# Patient Record
(demographics unavailable — no encounter records)

---

## 2024-10-11 NOTE — ASSESSMENT
[FreeTextEntry1] : 75 year old woman with history as above presenting for follow up  #Persistent shortness of breath, fatigue - Lower suspicion for pulmonary primary issue given clear CT Chest noncon with no intrinsic lung pathology identified - Could be cardiac in origin given insidious course - No overt signs of heart failure - Last TTE >10 years ago - Will refer to cardiology for repeat TTE, possible stress test. Age would likely make utility of CTA coronaries less useful  #Throat clearing/postnasal drip - Discussed continued use of methods she is already using as above - Discussed possibility of asking ENT if any other options she can explore - Counseled if night coughing continues to be bothersome to reach out and cough suppressant for use at night could be prescribed  #Body temp changes - Unclear trigger and unclear cause - Would expect lexapro to held mitigate - No diarrhea, no headaches endorsed by patient, no weight changes. Would have lower suspicion for carcinoid syndrome - Patient to continue to monitor symptoms  #HCM - Flu shot previously received per patient - Received COVID booster 2 weeks prior to COVID infection per patient  Return to clinic in January or sooner as needed   Patient's visit and plan of care discussed with Dr. Yuan Rushing MD PGY3

## 2024-10-11 NOTE — HISTORY OF PRESENT ILLNESS
[FreeTextEntry1] : Follow up [de-identified] : 75 year old woman with history as below presenting for follow up  - Concerns regarding persistent throat clearing, body temp changes, shortness of breath that persists from last year, fatigue from last year - Had COVID 3 weeks ago  #Throat clearing - Persistent from a year ago. Has tried humidified air, tried cough drops, trying oral antihistamines. Endorses staying hydrated. Worse when she lays down, but not necessarily when she wakes up in the morning per her endorsement. Denies it being productive.  #Body temp changes - At least a year, feeling hot and cold. Not sure of obvious times when it occurs - Didn't feel like this when she went through menopause - Denies sweating when she feels warm  #Persistent shortness of breath, fatigue - Since last year. Had CT chest noncon in June which was negative for pathology - Patient feels she is feeling out of breath when walking from apartment to car, breathing through mouth. Endorses it can be on an incline - Endorses she is physically active, does yoga, weights/calisthenics, walks 5 days a week. - Recently during the activities, she is feeling short of breath too. Ends of feeling exhausted after doing them - No difficulty breathing when laying down flat. No lower extremity swelling

## 2024-10-11 NOTE — REVIEW OF SYSTEMS
[Fatigue] : fatigue [Hot Flashes] : hot flashes [Cough] : cough [Dyspnea on Exertion] : dyspnea on exertion [Fever] : no fever [Night Sweats] : no night sweats [Discharge] : no discharge [Vision Problems] : no vision problems [Earache] : no earache [Nasal Discharge] : no nasal discharge [Chest Pain] : no chest pain [Lower Ext Edema] : no lower extremity edema [Abdominal Pain] : no abdominal pain [Nausea] : no nausea [Constipation] : no constipation [Diarrhea] : diarrhea [Vomiting] : no vomiting [Dysuria] : no dysuria [Nocturia] : no nocturia [Joint Pain] : no joint pain [Joint Stiffness] : no joint stiffness [Itching] : no itching [Nail Changes] : no nail changes [Headache] : no headache [Dizziness] : no dizziness [Depression] : no depression [Easy Bleeding] : no easy bleeding

## 2024-10-11 NOTE — PHYSICAL EXAM
[Normal Outer Ear/Nose] : the outer ears and nose were normal in appearance [Normal] : normal rate, regular rhythm, normal S1 and S2 and no murmur heard [de-identified] : Dried blood present around nasal mucosa

## 2024-10-21 NOTE — DISCUSSION/SUMMARY
[FreeTextEntry1] : In summary, Ms. Rowley is a 75-year-old female with a history of dyspnea on exertion over the past 6 months.  Her exam is unremarkable.  Her blood pressure is normal, chest clear, and cardiac exam within normal limits.  An EKG shows a T wave inversion in V2 which has been noted in the past.  The cause and significance of her symptoms is unclear and it is uncertain if any heart disease is present.  However, she needs a workup.   A BNP was drawn.  In addition she was scheduled for an echo and stress ech she will continue on her current regimen of amlodipine 5 and rosuvastatin 5.  o.  [EKG obtained to assist in diagnosis and management of assessed problem(s)] : EKG obtained to assist in diagnosis and management of assessed problem(s)

## 2024-10-21 NOTE — HISTORY OF PRESENT ILLNESS
[FreeTextEntry1] : 75-year-old female being seen in cardiac evaluation because of a history of dyspnea on exertion.  She has been symptomatic for the past 6 months.  She is physically active and finds that things that she did in the past she has more difficulty doing and she has to stop earlier.  She sometimes gets vague chest discomfort associated with the shortness of breath.  She has no symptoms at rest.  She had an episode of COVID last month which aggravated her symptoms, but the COVID resolved and she is still symptomatic.  She has no prior history of heart disease.  She had a cardiac workup in 2012 because of palpitations and shortness of breath and nothing was found.  She is hypertensive and hypercholesterolemic.  She had an LDL cholesterol of 94 on rosuvastatin 5.

## 2024-11-12 NOTE — REASON FOR VISIT
show
[Follow-Up: _____] : a [unfilled] follow-up visit

## 2024-11-15 NOTE — PHYSICAL EXAM
[Normocephalic] : normocephalic [Atraumatic] : atraumatic [EOMI] : extra ocular movement intact [Sclera nonicteric] : sclera nonicteric [Supple] : supple [No Supraclavicular Adenopathy] : no supraclavicular adenopathy [No Cervical Adenopathy] : no cervical adenopathy [No Thyromegaly] : no thyromegaly [Examined in the supine and seated position] : examined in the supine and seated position [Asymmetrical] : asymmetrical [Bra Size: ___] : Bra Size: [unfilled] [No dominant masses] : no dominant masses in right breast  [No dominant masses] : no dominant masses left breast [No Nipple Retraction] : no left nipple retraction [No Nipple Discharge] : no left nipple discharge [No Axillary Lymphadenopathy] : no left axillary lymphadenopathy [No Edema] : no edema [No Rashes] : no rashes [No Ulceration] : no ulceration [de-identified] : Her left breast is slightly larger than her right. S/P bilateral breast reduction.

## 2024-11-15 NOTE — PHYSICAL EXAM
Lab Results   Component Value Date    EGFR 46 10/19/2022    EGFR 54 10/15/2022    EGFR 46 10/14/2022    CREATININE 1 43 (H) 10/19/2022    CREATININE 1 26 10/15/2022    CREATININE 1 42 (H) 10/14/2022     Creatinine appears to be approximately baseline, will continue to monitor with repeat labs in a m  [Normocephalic] : normocephalic [Atraumatic] : atraumatic [EOMI] : extra ocular movement intact [Sclera nonicteric] : sclera nonicteric [Supple] : supple [No Supraclavicular Adenopathy] : no supraclavicular adenopathy [No Cervical Adenopathy] : no cervical adenopathy [No Thyromegaly] : no thyromegaly [Examined in the supine and seated position] : examined in the supine and seated position [Asymmetrical] : asymmetrical [Bra Size: ___] : Bra Size: [unfilled] [No dominant masses] : no dominant masses in right breast  [No dominant masses] : no dominant masses left breast [No Nipple Retraction] : no left nipple retraction [No Nipple Discharge] : no left nipple discharge [No Axillary Lymphadenopathy] : no left axillary lymphadenopathy [No Edema] : no edema [No Rashes] : no rashes [No Ulceration] : no ulceration [de-identified] : Her left breast is slightly larger than her right. S/P bilateral breast reduction.

## 2024-11-15 NOTE — CONSULT LETTER
[Dear  ___] : Dear  [unfilled], [Courtesy Letter:] : I had the pleasure of seeing your patient, [unfilled], in my office today. [Please see my note below.] : Please see my note below. [Consult Closing:] : Thank you very much for allowing me to participate in the care of this patient.  If you have any questions, please do not hesitate to contact me. [Sincerely,] : Sincerely, [DrChrissie  ___] : Dr. JOHNSON [FreeTextEntry3] : Susan M. Palleschi, MD, FACS Division of Breast Surgery Director, Breast Surgery [address] [address] (Phone) [phone] (Fax) [phone]

## 2024-11-15 NOTE — CONSULT LETTER
[Dear  ___] : Dear  [unfilled], [Courtesy Letter:] : I had the pleasure of seeing your patient, [unfilled], in my office today. [Please see my note below.] : Please see my note below. [Consult Closing:] : Thank you very much for allowing me to participate in the care of this patient.  If you have any questions, please do not hesitate to contact me. [Sincerely,] : Sincerely, [DrChrissie  ___] : Dr. JOHNSON [FreeTextEntry3] : Susan M. Palleschi, MD, FACS Division of Breast Surgery Director, Breast Surgery [address] [address] (Phone) [phone] (Fax) [phone]

## 2024-11-15 NOTE — ASSESSMENT
[FreeTextEntry1] : Left breast atypical lobular hyperplasia (ALH); 6 month follow up US of biopsied mass stable. Clinical breast exam benign  Continue clinical/radiologic surveillance  1. Annual bilateral mammogram and bilateral breast ultrasound due 10/2025 2. Advised monthly self-breast examinations and advised her to contact me if she has any concerns.  3. Follow up office visit as needed. She will follow up with [facility] Breast [facility] and she will be referred back to me as needed. 4. 6 month follow up left targeted breast ultrasound due 4/2025

## 2024-11-15 NOTE — ASSESSMENT
[FreeTextEntry1] : Left breast atypical lobular hyperplasia (ALH); 6 month follow up US of biopsied mass stable. Clinical breast exam benign  Continue clinical/radiologic surveillance  1. Annual bilateral mammogram and bilateral breast ultrasound due 10/2025 2. Advised monthly self-breast examinations and advised her to contact me if she has any concerns.  3. Follow up office visit as needed. She will follow up with [facility] and she will be referred back to me as needed. 4. 6 month follow up left targeted breast ultrasound due 4/2025

## 2024-11-15 NOTE — HISTORY OF PRESENT ILLNESS
[FreeTextEntry1] : The patient is a 75 year old patient here today for a follow up office visit.   She has a family history of breast cancer in her daughter diagnosed at age 49, doesn't know if she had genetic testing. Her mother had cervical cancer and her brother and father had prostate cancer. The patient has a history of bilateral breast reduction in 2012. She has a history of left breast surgical excisional biopsy (benign) and left breast cyst aspiration (benign).  3/13/2024 left 3:00 ultrasound-guided core biopsy with heart shaped marking clip.  Pathology revealed benign breast tissue with dense stromal fibrosis and focal atypical lobular hyperplasia.  The results are benign and concordant.  Given the presence of atypical lobular hyperplasia, surgical consultation and management is recommended.  JOAQUINA lifetime risk 12.1% 4/5/24 She saw me in consultation for the AL. I recommended continued radiologic management. 10/14/2024 Bilateral mammogram: Zenoner-Jaime Lifetime Risk: 14.9%. There are scattered areas of fibroglandular density. The breast parenchymal pattern is without significant change since the prior images. A scar marker overlies the anterior left breast. A newly seen biopsy marker is identified in the upper outer left breast middle depth. No suspicious mass, suspicious microcalcifications, or other sign of malignancy is identified. BREAST ARTERIAL CALCIFICATION ([place]): Grade 0 - No vascular calcifications. Note: The absence of breast arterial calcification does not exclude cardiovascular disease. Management of cardiovascular risk factors should be based clinically. Bilateral ultrasound:  RIGHT BREAST: No cysts or solid masses are identified. Only nonspecific appearing fatty and glandular tissue are currently evident. The previous finding at 8:00 is not seen at this time. LEFT BREAST: 3:00, 5 cm from the nipple, previously biopsied benign hypoechoic mass 1.1 x 0.8 x 0.5 cm, without significant change. 6:00 retroareolar region, circumscribed ovoid superficial probable cyst cluster measuring 0.9 x 0.6 x 0.3 cm, unchanged. BI-RADS 3, advise left targeted ultrasound in 6 months She saw MILTON Rust 9/19/2024, she defers chemoprevention but will continue follow ups. Last ov 9/19/24. She recommended follow up in 6 months.

## 2024-11-15 NOTE — ASSESSMENT
[FreeTextEntry1] : Left breast atypical lobular hyperplasia (ALH); 6 month follow up US of biopsied mass stable. Clinical breast exam benign  Continue clinical/radiologic surveillance  1. Annual bilateral mammogram and bilateral breast ultrasound due 10/2025 2. Advised monthly self-breast examinations and advised her to contact me if she has any concerns.  3. Follow up office visit as needed. She will follow up with [facility] Breast [facility] and she will be referred back to me as needed. 4. 6 month follow up left targeted breast ultrasound due 4/2025

## 2024-11-15 NOTE — CONSULT LETTER
[Dear  ___] : Dear  [unfilled], [Courtesy Letter:] : I had the pleasure of seeing your patient, [unfilled], in my office today. [Please see my note below.] : Please see my note below. [Consult Closing:] : Thank you very much for allowing me to participate in the care of this patient.  If you have any questions, please do not hesitate to contact me. [Sincerely,] : Sincerely, [DrChrissie  ___] : Dr. JOHNSON [FreeTextEntry3] : Susan M. Palleschi, MD, FACS Division of Breast Surgery Director, Breast Surgery [address] [address] (Phone) [phone] (Fax) [phone]

## 2024-11-15 NOTE — PHYSICAL EXAM
[Normocephalic] : normocephalic [Atraumatic] : atraumatic [EOMI] : extra ocular movement intact [Sclera nonicteric] : sclera nonicteric [Supple] : supple [No Supraclavicular Adenopathy] : no supraclavicular adenopathy [No Cervical Adenopathy] : no cervical adenopathy [No Thyromegaly] : no thyromegaly [Examined in the supine and seated position] : examined in the supine and seated position [Asymmetrical] : asymmetrical [Bra Size: ___] : Bra Size: [unfilled] [No dominant masses] : no dominant masses in right breast  [No dominant masses] : no dominant masses left breast [No Nipple Retraction] : no left nipple retraction [No Nipple Discharge] : no left nipple discharge [No Axillary Lymphadenopathy] : no left axillary lymphadenopathy [No Edema] : no edema [No Rashes] : no rashes [No Ulceration] : no ulceration [de-identified] : Her left breast is slightly larger than her right. S/P bilateral breast reduction.

## 2025-01-19 NOTE — PHYSICAL EXAM
[No Acute Distress] : no acute distress [Well Nourished] : well nourished [Well Developed] : well developed [Well-Appearing] : well-appearing [PERRL] : pupils equal round and reactive to light [EOMI] : extraocular movements intact [Supple] : supple [No Respiratory Distress] : no respiratory distress  [No Accessory Muscle Use] : no accessory muscle use [Coordination Grossly Intact] : coordination grossly intact [No Focal Deficits] : no focal deficits [Normal Gait] : normal gait [Normal Affect] : the affect was normal [de-identified] : 5/5 strength UE and LE b/l [de-identified] : CN II-XII intact, no dysdiadokinesis, grossly nl sensation, no focal deficits

## 2025-01-19 NOTE — PHYSICAL EXAM
[No Acute Distress] : no acute distress [Well Nourished] : well nourished [Well Developed] : well developed [Well-Appearing] : well-appearing [PERRL] : pupils equal round and reactive to light [Supple] : supple [EOMI] : extraocular movements intact [No Respiratory Distress] : no respiratory distress  [No Accessory Muscle Use] : no accessory muscle use [Coordination Grossly Intact] : coordination grossly intact [No Focal Deficits] : no focal deficits [Normal Gait] : normal gait [Normal Affect] : the affect was normal [de-identified] : 5/5 strength UE and LE b/l [de-identified] : CN II-XII intact, no dysdiadokinesis, grossly nl sensation, no focal deficits

## 2025-01-19 NOTE — HISTORY OF PRESENT ILLNESS
[de-identified] : 75 yo female with h/o as below here for f/up from last visit and also for possible concussion. One week ago hit head, came up from being down on ground getting something on counter and came up fast and hit left side of head and then banged nose.  Not sore today, was sore yesterday.  Still feeling a little fuzzy today, +loss of appetite, +nausea.   Sleeping a lot, 10 pm to 10am, very sleepy, believes concussion.  Not doing anything vigorous.   Does have CRNESHAW constantly since last week.  No blurry vision, no diplopia, no numbness/tingling or weakness in extremities.  Not feeling herself but not confused.  Doing things, still cooking and baking but just not 100 %, not usual energetic self.   Symptoms not worsening.  No LOC.  No dizziness, but sometimes stands somewhere and a little unsteady/woozy.   SOB from past visit resolved, had nl stress echo.  Humidifier has helped. No other active issues. [FreeTextEntry1] : f/up

## 2025-01-19 NOTE — HISTORY OF PRESENT ILLNESS
[de-identified] : 75 yo female with h/o as below here for f/up from last visit and also for possible concussion. One week ago hit head, came up from being down on ground getting something on counter and came up fast and hit left side of head and then banged nose.  Not sore today, was sore yesterday.  Still feeling a little fuzzy today, +loss of appetite, +nausea.   Sleeping a lot, 10 pm to 10am, very sleepy, believes concussion.  Not doing anything vigorous.   Does have CRENSHAW constantly since last week.  No blurry vision, no diplopia, no numbness/tingling or weakness in extremities.  Not feeling herself but not confused.  Doing things, still cooking and baking but just not 100 %, not usual energetic self.   Symptoms not worsening.  No LOC.  No dizziness, but sometimes stands somewhere and a little unsteady/woozy.   SOB from past visit resolved, had nl stress echo.  Humidifier has helped. No other active issues. [FreeTextEntry1] : f/up

## 2025-01-19 NOTE — ASSESSMENT
[FreeTextEntry1] : 75 yo female with h/o as above including HTN and hyperlipidemia here for f/up visit and suspected concussion.  No neuro deficits on exam and head trauma 1 week ago, would hold off on imaging for now, advised continued rest, light activities, to call back if any worsening.  RTO 3 months cpe when due.

## 2025-02-13 NOTE — ASSESSMENT
[FreeTextEntry1] : IMPRESSION 75 yo female with h/o HTN, HLD, had a head strike on 1/8/25 while came up from being down on ground and hit left side of head and then banged nose. She was feeling a little fuzzy, +loss of appetite, +nausea. She reports that she was very sleepy, does have HA constantly since last week. Pt was seen by her PCP who advised her to take it easy and rest. Pt with continued persistent headache and imbalance. No imaging done after the trauma. PCSS/NW scores are 48/15. Pt neurologically intact, but with instability at tandem stance.   PLAN: MRI Head w/o contrast for evaluation of persistent headache Tab Vitamin B2 400 mg daily Tab Magnesium Glycinate 400 mg Daily Tab Co Q 10 300mg daily VT for balance and gait training for strengthening and modalities , 2-3 times/week x 8 weeks. Encouraged aerobic exercises F/U in 3 weeks with Dr. Salas.

## 2025-02-13 NOTE — PHYSICAL EXAM
[General Appearance - Alert] : alert [General Appearance - In No Acute Distress] : in no acute distress [General Appearance - Well Nourished] : well nourished [General Appearance - Well-Appearing] : healthy appearing [Oriented To Time, Place, And Person] : oriented to person, place, and time [Affect] : the affect was normal [Person] : oriented to person [Place] : oriented to place [Time] : oriented to time [Short Term Intact] : short term memory intact [Cranial Nerves Optic (II)] : visual acuity intact bilaterally,  pupils equal round and reactive to light [Cranial Nerves Oculomotor (III)] : extraocular motion intact [Cranial Nerves Trigeminal (V)] : facial sensation intact symmetrically [Cranial Nerves Facial (VII)] : face symmetrical [Cranial Nerves Vestibulocochlear (VIII)] : hearing was intact bilaterally [Cranial Nerves Accessory (XI - Cranial And Spinal)] : head turning and shoulder shrug symmetric [Cranial Nerves Hypoglossal (XII)] : there was no tongue deviation with protrusion [Motor Tone] : muscle tone was normal in all four extremities [Motor Strength] : muscle strength was normal in all four extremities [Abnormal Walk] : normal gait [Balance] : balance was intact [FreeTextEntry8] : no drift, no imbalance at tandem [Sclera] : the sclera and conjunctiva were normal [PERRL With Normal Accommodation] : pupils were equal in size, round, reactive to light, with normal accommodation [Outer Ear] : the ears and nose were normal in appearance [Both Tympanic Membranes Were Examined] : both tympanic membranes were normal [Neck Appearance] : the appearance of the neck was normal [] : no respiratory distress [Respiration, Rhythm And Depth] : normal respiratory rhythm and effort [Heart Rate And Rhythm] : heart rate was normal and rhythm regular [No Spinal Tenderness] : no spinal tenderness [Involuntary Movements] : no involuntary movements were seen

## 2025-02-13 NOTE — HISTORY OF PRESENT ILLNESS
[< 3 months] : less than 3 months [FreeTextEntry1] : post concussion symptoms  [de-identified] : 75 yo female with h/o HTN, HLD, consulting for a head strike happened on 1/8/25 while came up from being down on ground getting something on counter and came up fast and hit left side of head and then banged nose. She was feeling a little fuzzy, +loss of appetite, +nausea. She reports that she was very sleepy, does have HA constantly since last week. Pt was seen by her PCP who advised her to take it easy and rest.    Today pt report having Head pressure 3-4/10 Taken Tylenol and Advil. Advil works better, but she feels her stomach is upset now. Pt feels tired always and nausea persists. She also reports, unable to focus.  No other active issues. She denies blurry vision, diplopia, numbness/tingling or weakness in extremities. Not feeling herself but not confused. Doing things, still cooking and baking but just not 100 %, not usual energetic self. However, she sometimes stands somewhere and a little unsteady/woozy.   PCSS concussion score: 48 6: none 5: HA, sleeping more, numbness/tingling, difficulty with remembering 4: nausea, drowsiness, difficulty concentrating,  3: slow, fog 2: vomiting, sensitivity to light, sensitivity to noise, dizziness, balance problem  1: none  Level of Activity- 8  NW Concussion Score-15  A lot -HA, light sensitivity, more tired, sleeping more, fog A little- noise sensitivity, nausea, unbalanced, confused, difficulty conc/remembering None--   Level of Activity- 8

## 2025-03-13 NOTE — ASSESSMENT
[FreeTextEntry1] : IMPRESSION  75 yo female with h/o HTN, HLD, had a head strike on 1/8/25 while came up from being down on ground and hit left side of head and then banged nose. She was feeling a little fuzzy, +loss of appetite, +nausea. Pt now with almost resolved symptoms. MRI head with fairly extensive white matter ischemic changes. PCSS/NW scores are 20/9. Pt neurologically intact.   PLAN:  Continue Tab Vitamin B2 400 mg daily, may stop if HA resolved Continue Tab Magnesium Glycinate 400 mg Daily Continue Tab Co Q 10 300mg daily Encouraged aerobic exercises F/U in 6 weeks in NP clinic

## 2025-03-13 NOTE — PHYSICAL EXAM
[General Appearance - Alert] : alert [General Appearance - In No Acute Distress] : in no acute distress [General Appearance - Well Nourished] : well nourished [General Appearance - Well-Appearing] : healthy appearing [Oriented To Time, Place, And Person] : oriented to person, place, and time [Affect] : the affect was normal [Person] : oriented to person [Place] : oriented to place [Time] : oriented to time [Short Term Intact] : short term memory intact [Cranial Nerves Optic (II)] : visual acuity intact bilaterally,  pupils equal round and reactive to light [Cranial Nerves Oculomotor (III)] : extraocular motion intact [Cranial Nerves Trigeminal (V)] : facial sensation intact symmetrically [Cranial Nerves Facial (VII)] : face symmetrical [Cranial Nerves Vestibulocochlear (VIII)] : hearing was intact bilaterally [Cranial Nerves Accessory (XI - Cranial And Spinal)] : head turning and shoulder shrug symmetric [Cranial Nerves Hypoglossal (XII)] : there was no tongue deviation with protrusion [Motor Tone] : muscle tone was normal in all four extremities [Motor Strength] : muscle strength was normal in all four extremities [Abnormal Walk] : normal gait [Balance] : balance was intact [Sclera] : the sclera and conjunctiva were normal [PERRL With Normal Accommodation] : pupils were equal in size, round, reactive to light, with normal accommodation [Outer Ear] : the ears and nose were normal in appearance [Both Tympanic Membranes Were Examined] : both tympanic membranes were normal [Neck Appearance] : the appearance of the neck was normal [] : no respiratory distress [Respiration, Rhythm And Depth] : normal respiratory rhythm and effort [Heart Rate And Rhythm] : heart rate was normal and rhythm regular [No Spinal Tenderness] : no spinal tenderness [Involuntary Movements] : no involuntary movements were seen [FreeTextEntry8] : no drift, no imbalance at tandem

## 2025-03-13 NOTE — HISTORY OF PRESENT ILLNESS
[FreeTextEntry1] : 75 yo female with h/o HTN, HLD, consulting for a head strike happened on 1/8/25 while came up from being down on ground getting something on counter and came up fast and hit left side of head and then banged nose. She was feeling a little fuzzy, +loss of appetite, +nausea. She reports that she was very sleepy, does have HA constantly since last week. Pt was seen by her PCP who advised her to take it easy and rest.   Today pt report having almost resolved symptoms. Headache and nausea are resolved. Pt continued to feel tired and sleepy.  She is unable to focus. She denies blurry vision, diplopia, numbness/tingling or weakness in extremities. VT evaluation completed but advised to do some home exercises.   PCSS concussion score: 20(48) 6: none 5: none 4: none 3: trouble falling asleep, balance problem, difficulty with concentration 2: Dizziness, sleeping more  1: HA, sensitivity to light, sensitivity to noise, dizziness, trouble falling asleep, fog, difficulty concentrating  Level of Activity- 10  NW Concussion Score-9  A lot - none A little- HA, light sensitivity, noise sensitivity, dizziness, unbalanced, more tired, sleeping more, fog, difficulty conc/remembering None--   Level of Activity- 10

## 2025-03-13 NOTE — ASSESSMENT
[FreeTextEntry1] : IMPRESSION  77 yo female with h/o HTN, HLD, had a head strike on 1/8/25 while came up from being down on ground and hit left side of head and then banged nose. She was feeling a little fuzzy, +loss of appetite, +nausea. Pt now with almost resolved symptoms. MRI head with fairly extensive white matter ischemic changes. PCSS/NW scores are 20/9. Pt neurologically intact.   PLAN:  Continue Tab Vitamin B2 400 mg daily, may stop if HA resolved Continue Tab Magnesium Glycinate 400 mg Daily Continue Tab Co Q 10 300mg daily Encouraged aerobic exercises F/U in 6 weeks in NP clinic

## 2025-03-25 NOTE — DISCUSSION/SUMMARY
[de-identified] : This patient has right hip osteoarthritis and rle radiculopathy. An extensive discussion was conducted on the natural history of the disease and the variety of surgical and non-surgical options available to the patient including, but not limited to non-steroidal anti-inflammatory medications, steroid injections, physical therapy, maintenance of ideal body weight, and reduction of activity. Prescribed a right hip intraarticular cortisone injection. PT rec but she does HEP instead. I recommend mobic and she has it at home and will take it. The patient will schedule an appointment as needed.

## 2025-03-25 NOTE — REASON FOR VISIT
[Follow-Up Visit] : a follow-up visit for [Osteoarthritis, Hip] : osteoarthritis of the hip [Radiculopathy] : radiculopathy

## 2025-03-25 NOTE — HISTORY OF PRESENT ILLNESS
[de-identified] : This is very nice 76 year-old woman experiencing right hip and groin and thigh pain, which is severe in intensity. The pain substantially limits activities of daily living. Walking tolerance is reduced. Endorses groin pain. OTTO have been helping her RLE radic.

## 2025-03-25 NOTE — PHYSICAL EXAM
[de-identified] : Patient is well nourished, well-developed, in no acute distress, with appropriate mood and affect. The patient is oriented to time, place, and person. Gait evaluation reveals a limp. There is no inguinal adenopathy. Examination of the contralateral hip shows normal range of motion, strength, no tenderness, and intact skin. The affected limb is well-perfused, shows a grossly normal motor and sensory examination. Examination of the hip shows no skin lesions. Hip motion is reduced and causes pain. Leg lengths are approximately equal. Stinchfield test is positive. Both hips are stable and muscle strength is normal. Pedal pulses are palpable.  [de-identified] : AP and lateral x-rays of the right hip, pelvis, and femur were ordered and taken in the office and demonstrate degenerative joint disease of the hip with joint space narrowing, osteophyte formation, and subchondral sclerosis.

## 2025-04-23 NOTE — REVIEW OF SYSTEMS
[Fatigue] : fatigue [Negative] : Eyes [Fever] : no fever [Chills] : no chills [Recent Change In Weight] : ~T no recent weight change [Chest Pain] : no chest pain [Palpitations] : no palpitations [Shortness Of Breath] : no shortness of breath [Cough] : no cough [Dyspnea on Exertion] : no dyspnea on exertion [Abdominal Pain] : no abdominal pain [Nausea] : no nausea [Constipation] : no constipation [Diarrhea] : diarrhea [Vomiting] : no vomiting [Heartburn] : no heartburn [Melena] : no melena [Dysuria] : no dysuria [Vaginal Discharge] : no vaginal discharge [Skin Rash] : no skin rash [Headache] : no headache [Dizziness] : no dizziness [Fainting] : no fainting [Anxiety] : no anxiety [Depression] : no depression [Easy Bleeding] : no easy bleeding [Easy Bruising] : no easy bruising [FreeTextEntry4] : see hpi [FreeTextEntry9] : see hpi [de-identified] : recently teary-eyed

## 2025-04-23 NOTE — PAST MEDICAL HISTORY
[Postmenopausal] : postmenopausal [Total Preg ___] : G[unfilled] [Full Term ___] : Full Term: [unfilled] [AB Spont ___] : miscarriages: [unfilled]  [FreeTextEntry1] : vaginal delivery, no postmenopausal vaginal bleeding, no uterine or ovarian abnl, no abnl paps, no STDs, not currently sexually active, gyn Dr. Ford

## 2025-04-23 NOTE — HEALTH RISK ASSESSMENT
[Patient reported mammogram was normal] : Patient reported mammogram was normal [Patient reported PAP Smear was normal] : Patient reported PAP Smear was normal [Patient reported bone density results were abnormal] : Patient reported bone density results were abnormal [Patient reported colonoscopy was normal] : Patient reported colonoscopy was normal [] :  [Fully functional (bathing, dressing, toileting, transferring, walking, feeding)] : Fully functional (bathing, dressing, toileting, transferring, walking, feeding) [Fully functional (using the telephone, shopping, preparing meals, housekeeping, doing laundry, using] : Fully functional and needs no help or supervision to perform IADLs (using the telephone, shopping, preparing meals, housekeeping, doing laundry, using transportation, managing medications and managing finances) [Designated Healthcare Proxy] : Designated healthcare proxy [Relationship: ___] : Relationship: [unfilled] [Monthly or less (1 pt)] : Monthly or less (1 point) [1 or 2 (0 pts)] : 1 or 2 (0 points) [Never (0 pts)] : Never (0 points) [No] : In the past 12 months have you used drugs other than those required for medical reasons? No [No falls in past year] : Patient reported no falls in the past year [0] : 1) Little interest or pleasure doing things: Not at all (0) [1] : 2) Feeling down, depressed, or hopeless for several days (1) [PHQ-2 Negative - No further assessment needed] : PHQ-2 Negative - No further assessment needed [None] : Patient does not have any barriers to medication adherence [Yes] : Reviewed medication list for presence of high-risk medications. [Opioids] : opioids [Never] : Never [With Significant Other] : lives with significant other [Significant Other] : lives with significant other [Sexually Active] : not sexually active [Reports changes in hearing] : Reports no changes in hearing [Reports changes in vision] : Reports no changes in vision [MammogramDate] : 10/24 [MammogramComments] : and left breast US 4/25 [PapSmearDate] : 03/24 [BoneDensityDate] : 05/24 [BoneDensityComments] : osteoporosis [ColonoscopyDate] : 02/14 [ColonoscopyComments] : not sure if done more recently, saw gi dr. troncoso [de-identified] : with cats; has one daughter and granddaughter [FreeTextEntry2] : retired, worked as  for pharmaceutical company [FreeTextEntry3] :  twice, now single, not currently in relationship [de-identified] : sees optho [AdvancecareDate] : 04/25

## 2025-04-23 NOTE — HEALTH RISK ASSESSMENT
[Patient reported mammogram was normal] : Patient reported mammogram was normal [Patient reported PAP Smear was normal] : Patient reported PAP Smear was normal [Patient reported bone density results were abnormal] : Patient reported bone density results were abnormal [Patient reported colonoscopy was normal] : Patient reported colonoscopy was normal [] :  [Fully functional (bathing, dressing, toileting, transferring, walking, feeding)] : Fully functional (bathing, dressing, toileting, transferring, walking, feeding) [Fully functional (using the telephone, shopping, preparing meals, housekeeping, doing laundry, using] : Fully functional and needs no help or supervision to perform IADLs (using the telephone, shopping, preparing meals, housekeeping, doing laundry, using transportation, managing medications and managing finances) [Designated Healthcare Proxy] : Designated healthcare proxy [Relationship: ___] : Relationship: [unfilled] [Monthly or less (1 pt)] : Monthly or less (1 point) [1 or 2 (0 pts)] : 1 or 2 (0 points) [Never (0 pts)] : Never (0 points) [No] : In the past 12 months have you used drugs other than those required for medical reasons? No [No falls in past year] : Patient reported no falls in the past year [0] : 1) Little interest or pleasure doing things: Not at all (0) [1] : 2) Feeling down, depressed, or hopeless for several days (1) [PHQ-2 Negative - No further assessment needed] : PHQ-2 Negative - No further assessment needed [None] : Patient does not have any barriers to medication adherence [Yes] : Reviewed medication list for presence of high-risk medications. [Opioids] : opioids [Never] : Never [With Significant Other] : lives with significant other [Significant Other] : lives with significant other [Sexually Active] : not sexually active [Reports changes in hearing] : Reports no changes in hearing [Reports changes in vision] : Reports no changes in vision [MammogramDate] : 10/24 [MammogramComments] : and left breast US 4/25 [PapSmearDate] : 03/24 [BoneDensityDate] : 05/24 [BoneDensityComments] : osteoporosis [ColonoscopyDate] : 02/14 [ColonoscopyComments] : not sure if done more recently, saw gi dr. troncoso [de-identified] : with cats; has one daughter and granddaughter [FreeTextEntry2] : retired, worked as  for pharmaceutical company [FreeTextEntry3] :  twice, now single, not currently in relationship [de-identified] : sees optho [AdvancecareDate] : 04/25

## 2025-04-23 NOTE — HISTORY OF PRESENT ILLNESS
[FreeTextEntry1] : physical [de-identified] : 75 yo female with h/o as below here for CPE. Has some congestion/cough/URI symptoms since yesterday, just traveled back on plane, usually gets sick after goes on plane. Asking about measles immunity, had measles as child, had all vaccines. Had epidural in March, didn't work too well, gave her steroid injection in right hip in april, ortho also gave her tramadol 100 mg ER daily, started feeling a little foggy and teary-eyed.  Yesterday stopped the tramadol and meloxicam and tylenol (was taking tylenol 1000 mg q6).  Had to take tylenol today as had pain.  In the past tried PT but then was too painful to do PT.  Pain in right hip and right groin/leg was told from pinched nerve in spine from herniated disc (which usually is treated with epidural with relief).  Will start to fall because right leg gets weak from hip down and will have to catch herself to prevent from fully falling.  Doesn't use cane. Had arthritis in knee and takes meloxicam for knee. Feels tired all the time. Concussion went away, feels better, has f/up with neuro.  Had imaging showing chronic ischemic changes. No further ROJAS, saw cardiology and had stress test done. Had RSV vaccine in the fall.

## 2025-04-23 NOTE — REVIEW OF SYSTEMS
[Fatigue] : fatigue [Negative] : Eyes [Fever] : no fever [Chills] : no chills [Recent Change In Weight] : ~T no recent weight change [Chest Pain] : no chest pain [Palpitations] : no palpitations [Shortness Of Breath] : no shortness of breath [Cough] : no cough [Dyspnea on Exertion] : no dyspnea on exertion [Abdominal Pain] : no abdominal pain [Nausea] : no nausea [Constipation] : no constipation [Diarrhea] : diarrhea [Vomiting] : no vomiting [Heartburn] : no heartburn [Melena] : no melena [Dysuria] : no dysuria [Vaginal Discharge] : no vaginal discharge [Skin Rash] : no skin rash [Headache] : no headache [Dizziness] : no dizziness [Fainting] : no fainting [Anxiety] : no anxiety [Depression] : no depression [Easy Bleeding] : no easy bleeding [Easy Bruising] : no easy bruising [FreeTextEntry4] : see hpi [FreeTextEntry9] : see hpi [de-identified] : recently teary-eyed

## 2025-04-23 NOTE — REVIEW OF SYSTEMS
[Fatigue] : fatigue [Negative] : Eyes [Fever] : no fever [Chills] : no chills [Recent Change In Weight] : ~T no recent weight change [Chest Pain] : no chest pain [Palpitations] : no palpitations [Shortness Of Breath] : no shortness of breath [Cough] : no cough [Dyspnea on Exertion] : no dyspnea on exertion [Abdominal Pain] : no abdominal pain [Nausea] : no nausea [Constipation] : no constipation [Diarrhea] : diarrhea [Vomiting] : no vomiting [Heartburn] : no heartburn [Melena] : no melena [Dysuria] : no dysuria [Vaginal Discharge] : no vaginal discharge [Skin Rash] : no skin rash [Headache] : no headache [Dizziness] : no dizziness [Fainting] : no fainting [Anxiety] : no anxiety [Depression] : no depression [Easy Bleeding] : no easy bleeding [Easy Bruising] : no easy bruising [FreeTextEntry4] : see hpi [FreeTextEntry9] : see hpi [de-identified] : recently teary-eyed

## 2025-04-23 NOTE — HISTORY OF PRESENT ILLNESS
[FreeTextEntry1] : physical [de-identified] : 75 yo female with h/o as below here for CPE. Has some congestion/cough/URI symptoms since yesterday, just traveled back on plane, usually gets sick after goes on plane. Asking about measles immunity, had measles as child, had all vaccines. Had epidural in March, didn't work too well, gave her steroid injection in right hip in april, ortho also gave her tramadol 100 mg ER daily, started feeling a little foggy and teary-eyed.  Yesterday stopped the tramadol and meloxicam and tylenol (was taking tylenol 1000 mg q6).  Had to take tylenol today as had pain.  In the past tried PT but then was too painful to do PT.  Pain in right hip and right groin/leg was told from pinched nerve in spine from herniated disc (which usually is treated with epidural with relief).  Will start to fall because right leg gets weak from hip down and will have to catch herself to prevent from fully falling.  Doesn't use cane. Had arthritis in knee and takes meloxicam for knee. Feels tired all the time. Concussion went away, feels better, has f/up with neuro.  Had imaging showing chronic ischemic changes. No further ROJAS, saw cardiology and had stress test done. Had RSV vaccine in the fall.

## 2025-04-23 NOTE — HISTORY OF PRESENT ILLNESS
[FreeTextEntry1] : physical [de-identified] : 75 yo female with h/o as below here for CPE. Has some congestion/cough/URI symptoms since yesterday, just traveled back on plane, usually gets sick after goes on plane. Asking about measles immunity, had measles as child, had all vaccines. Had epidural in March, didn't work too well, gave her steroid injection in right hip in april, ortho also gave her tramadol 100 mg ER daily, started feeling a little foggy and teary-eyed.  Yesterday stopped the tramadol and meloxicam and tylenol (was taking tylenol 1000 mg q6).  Had to take tylenol today as had pain.  In the past tried PT but then was too painful to do PT.  Pain in right hip and right groin/leg was told from pinched nerve in spine from herniated disc (which usually is treated with epidural with relief).  Will start to fall because right leg gets weak from hip down and will have to catch herself to prevent from fully falling.  Doesn't use cane. Had arthritis in knee and takes meloxicam for knee. Feels tired all the time. Concussion went away, feels better, has f/up with neuro.  Had imaging showing chronic ischemic changes. No further ROJAS, saw cardiology and had stress test done. Had RSV vaccine in the fall.

## 2025-04-23 NOTE — HEALTH RISK ASSESSMENT
[Patient reported mammogram was normal] : Patient reported mammogram was normal [Patient reported PAP Smear was normal] : Patient reported PAP Smear was normal [Patient reported bone density results were abnormal] : Patient reported bone density results were abnormal [Patient reported colonoscopy was normal] : Patient reported colonoscopy was normal [] :  [Fully functional (bathing, dressing, toileting, transferring, walking, feeding)] : Fully functional (bathing, dressing, toileting, transferring, walking, feeding) [Fully functional (using the telephone, shopping, preparing meals, housekeeping, doing laundry, using] : Fully functional and needs no help or supervision to perform IADLs (using the telephone, shopping, preparing meals, housekeeping, doing laundry, using transportation, managing medications and managing finances) [Designated Healthcare Proxy] : Designated healthcare proxy [Relationship: ___] : Relationship: [unfilled] [Monthly or less (1 pt)] : Monthly or less (1 point) [1 or 2 (0 pts)] : 1 or 2 (0 points) [Never (0 pts)] : Never (0 points) [No] : In the past 12 months have you used drugs other than those required for medical reasons? No [No falls in past year] : Patient reported no falls in the past year [0] : 1) Little interest or pleasure doing things: Not at all (0) [1] : 2) Feeling down, depressed, or hopeless for several days (1) [PHQ-2 Negative - No further assessment needed] : PHQ-2 Negative - No further assessment needed [None] : Patient does not have any barriers to medication adherence [Yes] : Reviewed medication list for presence of high-risk medications. [Opioids] : opioids [Never] : Never [With Significant Other] : lives with significant other [Significant Other] : lives with significant other [Sexually Active] : not sexually active [Reports changes in hearing] : Reports no changes in hearing [Reports changes in vision] : Reports no changes in vision [MammogramDate] : 10/24 [MammogramComments] : and left breast US 4/25 [PapSmearDate] : 03/24 [BoneDensityDate] : 05/24 [BoneDensityComments] : osteoporosis [ColonoscopyDate] : 02/14 [ColonoscopyComments] : not sure if done more recently, saw gi dr. troncoso [de-identified] : with cats; has one daughter and granddaughter [FreeTextEntry2] : retired, worked as  for pharmaceutical company [FreeTextEntry3] :  twice, now single, not currently in relationship [de-identified] : sees optho [AdvancecareDate] : 04/25

## 2025-04-23 NOTE — ASSESSMENT
[Vaccines Reviewed] : Immunizations reviewed today. Please see immunization details in the vaccine log within the immunization flowsheet.  [FreeTextEntry1] : avoid tramadol 100 mg, can take meloixcam 7.5 mg 1-2 tabs/day, tylenol but lower dose if can tolerate (maybe 1-2 tabs every 8 hours) advised MARILYN buitrago

## 2025-04-28 NOTE — ASSESSMENT
[FreeTextEntry1] : IMPRESSION  75 yo female with h/o HTN, HLD, had a head strike on 1/8/25 while came up from being down on ground and hit left side of head and then banged nose. She was feeling a little fuzzy, +loss of appetite, +nausea. Pt now with resolved symptoms and back to normal routines.  MRI head with fairly extensive white matter ischemic changes, but over all she is feeling well.  PCSS/NW scores are 20/9. Pt neurologically intact.   PLAN:  Continue Tab Vitamin B2 400 mg daily, may stop if HA resolved Continue Tab Magnesium Glycinate 400 mg Daily Continue Tab Co Q 10 300mg daily Encouraged aerobic exercises F/U as needed

## 2025-04-28 NOTE — HISTORY OF PRESENT ILLNESS
[FreeTextEntry1] : 75 yo female with h/o HTN, HLD, consulting for a head strike happened on 1/8/25 while came up from being down on ground getting something on counter and came up fast and hit left side of head and then banged nose. She was feeling a little fuzzy, +loss of appetite, +nausea. She reports that she was very sleepy, does have HA constantly since last week. Pt was seen by her PCP who advised her to take it easy and rest.   Today pt report having resolved symptoms. Headache and nausea are resolved.  Back pain and hip pain, taking Tramadol, taken pain management gave right hip injections. Sleeping is better. However, she continued to feel tired and sleepy.  She is unable to focus. She denies blurry vision, diplopia, numbness/tingling or weakness in extremities. VT evaluation completed but advised to do some home exercises.   PCSS Score: 8 6: none 5: none 4: none 3: none 2: dizziness, sleeping more than usual, drowsiness, difficulty with remembering 1: none   level of function - 8   NW Concussion Score: 4 A lot: none A little: unbalanced, more tired than usual, sleeping more than usual, difficulty concentrating/remembering.   level of function - 8

## 2025-04-28 NOTE — HISTORY OF PRESENT ILLNESS
[FreeTextEntry1] : 77 yo female with h/o HTN, HLD, consulting for a head strike happened on 1/8/25 while came up from being down on ground getting something on counter and came up fast and hit left side of head and then banged nose. She was feeling a little fuzzy, +loss of appetite, +nausea. She reports that she was very sleepy, does have HA constantly since last week. Pt was seen by her PCP who advised her to take it easy and rest.   Today pt report having resolved symptoms. Headache and nausea are resolved.  Back pain and hip pain, taking Tramadol, taken pain management gave right hip injections. Sleeping is better. However, she continued to feel tired and sleepy.  She is unable to focus. She denies blurry vision, diplopia, numbness/tingling or weakness in extremities. VT evaluation completed but advised to do some home exercises.   PCSS Score: 8 6: none 5: none 4: none 3: none 2: dizziness, sleeping more than usual, drowsiness, difficulty with remembering 1: none   level of function - 8   NW Concussion Score: 4 A lot: none A little: unbalanced, more tired than usual, sleeping more than usual, difficulty concentrating/remembering.   level of function - 8

## 2025-04-28 NOTE — HISTORY OF PRESENT ILLNESS
[FreeTextEntry1] : 75 yo female with h/o HTN, HLD, consulting for a head strike happened on 1/8/25 while came up from being down on ground getting something on counter and came up fast and hit left side of head and then banged nose. She was feeling a little fuzzy, +loss of appetite, +nausea. She reports that she was very sleepy, does have HA constantly since last week. Pt was seen by her PCP who advised her to take it easy and rest.   Today pt report having resolved symptoms. Headache and nausea are resolved.  Back pain and hip pain, taking Tramadol, taken pain management gave right hip injections. Sleeping is better. However, she continued to feel tired and sleepy.  She is unable to focus. She denies blurry vision, diplopia, numbness/tingling or weakness in extremities. VT evaluation completed but advised to do some home exercises.   PCSS Score: 8 6: none 5: none 4: none 3: none 2: dizziness, sleeping more than usual, drowsiness, difficulty with remembering 1: none   level of function - 8   NW Concussion Score: 4 A lot: none A little: unbalanced, more tired than usual, sleeping more than usual, difficulty concentrating/remembering.   level of function - 8  Helical Rim Advancement Flap Text: The defect edges were debeveled with a #15 blade scalpel.  Given the location of the defect and the proximity to free margins (helical rim) a double helical rim advancement flap was deemed most appropriate.  Using a sterile surgical marker, the appropriate advancement flaps were drawn incorporating the defect and placing the expected incisions between the helical rim and antihelix where possible.  The area thus outlined was incised through and through with a #15 scalpel blade.  With a skin hook and iris scissors, the flaps were gently and sharply undermined and freed up.

## 2025-04-28 NOTE — ASSESSMENT
[FreeTextEntry1] : IMPRESSION  77 yo female with h/o HTN, HLD, had a head strike on 1/8/25 while came up from being down on ground and hit left side of head and then banged nose. She was feeling a little fuzzy, +loss of appetite, +nausea. Pt now with resolved symptoms and back to normal routines.  MRI head with fairly extensive white matter ischemic changes, but over all she is feeling well.  PCSS/NW scores are 20/9. Pt neurologically intact.   PLAN:  Continue Tab Vitamin B2 400 mg daily, may stop if HA resolved Continue Tab Magnesium Glycinate 400 mg Daily Continue Tab Co Q 10 300mg daily Encouraged aerobic exercises F/U as needed

## 2025-05-07 NOTE — ASSESSMENT
[FreeTextEntry1] : DANIEL  is a 75 year   year  year old female who presented today to review her risk of developing of breast cancer based on her family history and personal risk factors.  A complete risk assessment was performed and the results suggest that DANIEL   is at increased risk for breast cancer.  Ms. ROCHE was diagnosed with atypical lobular hyperplasia.  She is being conservatively managed by PALLESCHI MD,SUSAN M  with close imaging follow-up planned; no surgical excision recommended at this time.   We discussed that atypical lobular hyperplasia is a high-risk lesion and confers a substantial increase in the risk of subsequent breast cancer in both ipsilateral and contralateral breast.  Atypical lobular hyperplasia is associated with 1-2% annual cumulative risk of developing invasive breast cancer. Given the atypical hyperplasia and high breast cancer risk score, she is a candidate for primary risk reduction medication.  Options for risk reduction medication in a post-menopausal patient include aromatase inhibitors, raloxifene, tamoxifen.   We review results of her upcoming bone density test prior to finalizing a treatment option.    Empiric Risk Calculations: Tyrer Plaistow Score:  12 % LIFETIME risk of developing breast cancer (general population risk 12%) Maribeth Risk Score: 9%  5-year Risk    Based on this information the following risk reduction/screening plan has been recommended:   RISK REDUCTION PLAN: - Annual mammogram and breast ultrasound - DUE:  October 2025 (ordered) - Baseline Breast MRI:  Not recommended at this time; TC score < 20% - Genetics:  Recommended genetic testing given daughter's early onset breast cancer or if possible ascertain if her daughter had genetic testing.  - Chemoprevention: Deferred at this time  - Lifestyle Recommendations:  Limit consumption of alcoholic beverages to no more than one drink equivalent per day; Exercise 150-300 minutes of moderate intensity physical activity per week; Maintain a healthy body weight of 20-25 BMI to help reduce risk of breast cancer.  - continue f/up with gynecologist and PCP for routine medical care. - continue f/up with breast surgeon as recommended - f/up with Breast Wellness Program in 6-12 months  Osteoporosis - continue Boniva and f/up with osteoporosis program

## 2025-05-07 NOTE — CONSULT LETTER
[Dear  ___] : Dear  [unfilled], [Consult Letter:] : I had the pleasure of evaluating your patient, [unfilled]. [Please see my note below.] : Please see my note below. [Consult Closing:] : Thank you very much for allowing me to participate in the care of this patient.  If you have any questions, please do not hesitate to contact me. [Sincerely,] : Sincerely, [DrChrissie  ___] : Dr. JOHNSON [DrChrissie ___] : Dr. JOHNSON [FreeTextEntry1] :  DANIEL ROCHE   was seen as part of the [facility] Wellness [facility] Cancer Prevention Program.  She is at increased risk for development of breast cancer and is a candidate for risk reduction medication, enhanced breast imaging and continued monitoring in the Breast Wellness Program. [FreeTextEntry3] : HUSSEIN Restrepo MD [facility] Division of Hematology/[place] [address] Tel: [phone] Fax: [phone]

## 2025-05-07 NOTE — REVIEW OF SYSTEMS
[Diarrhea: Grade 0] : Diarrhea: Grade 0 [Negative] : Allergic/Immunologic [de-identified] : hair thinning - using Viviscal

## 2025-05-07 NOTE — PHYSICAL EXAM
[Fully active, able to carry on all pre-disease performance without restriction] : Status 0 - Fully active, able to carry on all pre-disease performance without restriction [Normal] : affect appropriate [de-identified] :  right breast prominent striated tissue inferior breast;  Left Breast 4-5:00 fibrodense tissue noted on exam

## 2025-05-07 NOTE — HISTORY OF PRESENT ILLNESS
[de-identified] : Ms. RASHMI ROCHE is 75 year female referred by PALLESCHI MD,SUSAN M for breast cancer risk assessment and risk reduction management based on Her family history and/or personal risk factors for breast cancer.   The patient has a history of bilateral breast reduction in 2012. She has a history of left breast surgical excisional biopsy (benign) and left breast cyst aspiration (benign).  She initially presented earlier this year: 3/6/2024 bilateral breast ultrasound for evaluation of right breast pain. Right: 8:00, 3 cm from the nipple, lymph node measuring 0.4 x 0.4 x 0.2 cm. Lower outer quadrant and upper inner quadrant where the patient reported her areas of pain, no sonographic abnormality. Left breast: 3:00, 5 cm from the nipple, irregular hypoechoic focus measuring 1.1 x 0.5 x 0.9 cm. Ultrasound-guided core biopsy is recommended. 6:00 retroareolar, circumscribed ovoid wider than tall probable cyst cluster measuring 0.9 x 0.6 x 0.3 cm. Left axilla, no abnormal lymph nodes are seen Impression: Irregular hypoechoic focus 3:00 left breast for which ultrasound core biopsy is recommended. Probably benign cyst cluster 6:00 retroareolar left breast for which follow-up targeted left breast ultrasound in 6 months is recommended. BI-RADS 4B.  3/13/2024 left 3:00 ultrasound-guided core biopsy with heart shaped marking clip. Pathology revealed benign breast tissue with dense stromal fibrosis and focal atypical lobular hyperplasia. The results are benign and concordant. Given the presence of atypical lobular hyperplasia, surgical consultation and management is recommended. Please note that a probably benign cyst cluster was seen in the left breast 6:00 retroareolar location for which 6-month follow-up breast ultrasound reevaluation was recommended.  On April 5, 2024 -- Rashmi was seen by Dr. Palleschi for consideration of surgical excision of ALH.  Dr. Palleschi recommended conservative management and close interval followup imaging.    RISK ASSESSMENT FACTORS:   GENERAL: Height:  64 inches Weight: 155 lbs BMI: 26 Age of Menarche: 13 Menopausal Status: Post  OCP/HRT/Fertility: None Age of Menopause: 50  # pregnancies/live birth:   Age of first live birth:   21                                                                                         BREAST RISK FACTORS:                                                                                     # Breast Biopsies:      1                                                  Results of biopsies: 3/2024: ALH Breast Density:  fibroglandular   FAMILY HISTORY:  A complete family history was obtained including first and second degree relatives Family history is significant for daughter w/ breast cancer Personal Genetic Testing Results if applicable:  None Family Genetic Testing Results: Unknown   SOCIAL HISTORY:  Patient denies history of smoking and regular ETOH use.   Does not have a regular exercise routine   IMAGING SUMMARY: Mammogram:  10/13/2023 Breast Ultrasound:  3/6/2024 Breast MRI:  Not recommended  Other Screening:  BMD 4/2022: Osteoporosis --> on [facility] TEAM: PCP:  Dr. Bolden GYN:  Dr. Ford BREAST SURGEON:  Dr. Palleschi        [de-identified] : 5/7/2025 Patient presents today for management of her high risk of developing breast cancer.  She is being conservatively managed by Dr. Palleschi  with close imaging follow-up planned; no surgical excision recommended at this time for ALH DANIEL  denies any breast masses, breast tenderness, skin changes or nipple discharge. Dx: w/osteoporosis on ibandronate with worsening; seen by Mati Pack NP and discussed other options for treatment including Prolia  IMAGING SUMMARY: Mammogram:  10/2024 Breast Ultrasound:  1-2-24 ===> left targeted US 6 mth 4/2025 - Birads 3.0 (note area of ALH decreased in size) Breast MRI:  Not recommended  Other Screening:  BMD 5/2024 Osteoporosis --> on [facility]

## 2025-05-07 NOTE — END OF VISIT
[FreeTextEntry3] : Patient seen and examined with [facility] history,medical history, surgical history, family history, social history, OB/GYN history reviewed.  Pathology and imaging reviewed.  Breast exam as above. Patient is at increased risk for breast cancer due to atypical hyperplasia Chemoprevention with AI and Raloxifene reviewed, will prescribe based on DEXA results Breast imaging surveillance plan d/w her.  Genetic testing discussed. F/u in breast wellness program closely.  All questions answered.

## 2025-05-29 NOTE — DISCUSSION/SUMMARY
[de-identified] : I went over the pathophysiology of the patient's symptoms in great detail with the patient. I discussed the underlying pathophysiology of the patient's condition in great detail with the patient. I went over the patient's x-rays with them in great detail. I informed the patient that surgery, a total hip replacement, will be required to provide them long term relief from their symptoms. Patient understands she needs to consider hip replacement given she is losing motion and conservative measures are not providing enough relief. I informed her that I no longer perform hip replacements, and she was provided with the names of my partners Dr. Knapp and Dr. Jolley.   All of their questions were answered. They understand and consent to the plan.   FU with Dr. Knapp or Dr. Jolley for a surgical consult

## 2025-05-29 NOTE — PHYSICAL EXAM
[de-identified] : Constitutional o Appearance : well-nourished, well developed, alert, in no acute distress  Head and Face o Head :  Inspection : atraumatic, normocephalic o Face :  Inspection : no visible rash or discoloration Respiratory o Respiratory Effort: breathing unlabored  Neurologic o Mental Status Examination :  Orientation : alert and oriented X 3 Psychiatric o Mood and Affect: mood normal, affect appropriate Cardiovascular o Observation/Palpation : - no swelling Lymphatic o Additional Nodes : No palpable lymph nodes present  Lumbosacral Spine o Inspection : no visible rash or discoloration o Palpation : paraspinal musculature nontender o Range of Motion : rotation side bending and extension does not cause discomfort  o Muscle Strength : paraspinal muscle strength and tone within normal limits o Muscle Tone : paraspinal muscle strength and tone within normal limits Tests: straight leg test negative   Right Lower Extremity o Buttock : no tenderness, swelling or deformities o Right Hip :  Inspection/Palpation : no tenderness, swelling or deformities  Range of Motion : full flexion and painful at the extremes of motion, no crepitance  Stability : joint stability intact  Strength : extension, flexion, adduction, abduction, internal rotation and external rotation 4+/5   Left Lower Extremity o Buttock : no tenderness, swelling or deformities  o Left Hip :  Inspection/Palpation : no tenderness, swelling or deformities  Range of Motion : full flexion and rotation thigh pain no crepitance  Stability : joint stability intact  Strength : extension, flexion, adduction, abduction, internal rotation and external rotation 5/5   Gait: abductor lurch to the right, good core strength, no significant extremity swelling or lymphedema  Radiology Results 5/29/2025  Lumbosacral Spine: AP and lateral views were obtained and reveal diffuse facet arthropathy and mild foraminal stenosis of L5-S1 osteopenia  mild retrolisthesis of L2-3 anterior osteophytes of L2-3 and evidence of gall bladder surgery

## 2025-05-29 NOTE — HISTORY OF PRESENT ILLNESS
[de-identified] : 76 year old female presents complaining of right groin and thigh pain. She has history of spinal stenosis and OA of her right hip. She has been doing Pt on and off for a few years. She notes that her right hip pain worsened a couple of months ago. She went to Dr. Burkett for pain management. She received an epidural injection in early March. It did not give her any relief. She then returned for an injection in her right hip in April, which was not helpful either. Most recently, she received an injection for piriformis pain and has not gotten any better. She has continued with PT. She is concerned because her pain is significant, especially about the right groin and buttock. She does have pain that radiates towards her right foot. She is experiencing tingling into her right leg. Walking has become quite uncomfortable. She was recently in [place] and had a hard time doing everything due to the increased amount of walking. Of note, she saw Dr. Razo on 03/25/25. X-rays of the right hip were obtained that showed significant degenerative arthritis of the right hip

## 2025-05-29 NOTE — ADDENDUM
[FreeTextEntry1] : I, TUYET VASQUEZ, acted solely as a scribe for Dr. Sanjiv Ferrer on this date 05/29/2025.  All medical record entries made by the Scribe were at my, Dr. Sanjiv Ferrer, direction and personally dictated by me on 05/29/2025. I have reviewed the chart and agree that the record accurately reflects my personal performance of the history, physical exam, assessment and plan. I have also personally directed, reviewed, and agreed with the chart.

## 2025-07-20 NOTE — ASSESSMENT
[High Risk Surgery - Intraperitoneal, Intrathoracic or Supringuinal Vascular Procedures] : High Risk Surgery - Intraperitoneal, Intrathoracic or Supringuinal Vascular Procedures - No (0) [Ischemic Heart Disease] : Ischemic Heart Disease - No (0) [Congestive Heart Failure] : Congestive Heart Failure - No (0) [Prior Cerebrovascular Accident or TIA] : Prior Cerebrovascular Accident or TIA - No (0) [Creatinine >= 2mg/dL (1 Point)] : Creatinine >= 2mg/dL - No (0) [Insulin-dependent Diabetic (1 Point)] : Insulin-dependent Diabetic - No (0) [0] : 0 , RCRI Class: I, Risk of Post-Op Cardiac Complications: 3.9%, 95% CI for Risk Estimate: 2.8% - 5.4% [Patient Optimized for Surgery] : Patient optimized for surgery [No Further Testing Recommended] : no further testing recommended [Continue medications as is] : Continue current medications [As per surgery] : as per surgery [FreeTextEntry4] : Ms. DANIEL ROCHE is a 76 year old White  female  with history of osteoporosis, HTN, hyperlipidemia, atypical lobular hyperplasia breast presented today for preop medical clearance. Pt is low risk candidate for low risk procedure with no further w/up required prior to planned surgery and no contraindication to proceeding with planned surgery.    [FreeTextEntry7] : Do not take Aspirin, NSAID( Motrin, Ibuprofen etc.), Herb, supplement 7 days prior to surgery. Take Amlodipine with small sips of water in the morning of surgery.

## 2025-07-20 NOTE — PHYSICAL EXAM
[de-identified] : WDWN in NAD HEENT:  unremarkable Neck:  supple, no JVD, no LN Lungs:  CTA B/L, no W/R/R Heart:  Reg rate, +S1S2, no M/R/G Abdomen:  soft, NT, ND, +BS, no masses, no HS-megaly Genital: No pubic or genital lesions noted. Ext:  no C/C/E Neuro:  no focal deficits  [de-identified] : throat is not crowded [de-identified] : Rt hip's internal/external rotation is limited due to pain. Good b/l DP/PT pulse.

## 2025-07-20 NOTE — HISTORY OF PRESENT ILLNESS
[Follow-Up: _____] : a [unfilled] follow-up visit [No Pertinent Cardiac History] : no history of aortic stenosis, atrial fibrillation, coronary artery disease, recent myocardial infarction, or implantable device/pacemaker [No Pertinent Pulmonary History] : no history of asthma, COPD, sleep apnea, or smoking [No Adverse Anesthesia Reaction] : no adverse anesthesia reaction in self or family member [(Patient denies any chest pain, claudication, dyspnea on exertion, orthopnea, palpitations or syncope)] : Patient denies any chest pain, claudication, dyspnea on exertion, orthopnea, palpitations or syncope [Moderate (4-6 METs)] : Moderate (4-6 METs) [Chronic Anticoagulation] : no chronic anticoagulation [Chronic Kidney Disease] : no chronic kidney disease [Diabetes] : no diabetes [FreeTextEntry1] : Rt hip replacement [FreeTextEntry2] : 8/5/25 [FreeTextEntry3] : Dr. Knapp FAX) [phone] and  [phone] [FreeTextEntry4] : Ms. DANIEL ROCHE is a 76 year old White  female  with history of osteoporosis, HTN, hyperlipidemia, atypical lobular hyperplasia breast presented today for preop medical clearance.  Reviewed note by Dr. Knapp.   She remains very active with yoga, exercises, walking except using stairs due to severe Rt hip joint pain.  For 4 years tried conservative tx and joint cortisone injections but it did not help much at this time.  Her initial surgery date was postponed to August from July. She went to [facility] 6/20/25 and this morning again. Denied fever, chills,CP, SOB, abdominal pain, n/v/c/d.

## 2025-07-20 NOTE — PHYSICAL EXAM
[de-identified] : WDWN in NAD HEENT:  unremarkable Neck:  supple, no JVD, no LN Lungs:  CTA B/L, no W/R/R Heart:  Reg rate, +S1S2, no M/R/G Abdomen:  soft, NT, ND, +BS, no masses, no HS-megaly Genital: No pubic or genital lesions noted. Ext:  no C/C/E Neuro:  no focal deficits  [de-identified] : throat is not crowded [de-identified] : Rt hip's internal/external rotation is limited due to pain. Good b/l DP/PT pulse.

## 2025-07-20 NOTE — HISTORY OF PRESENT ILLNESS
[No Pertinent Cardiac History] : no history of aortic stenosis, atrial fibrillation, coronary artery disease, recent myocardial infarction, or implantable device/pacemaker [No Pertinent Pulmonary History] : no history of asthma, COPD, sleep apnea, or smoking [No Adverse Anesthesia Reaction] : no adverse anesthesia reaction in self or family member [(Patient denies any chest pain, claudication, dyspnea on exertion, orthopnea, palpitations or syncope)] : Patient denies any chest pain, claudication, dyspnea on exertion, orthopnea, palpitations or syncope [Moderate (4-6 METs)] : Moderate (4-6 METs) [Chronic Anticoagulation] : no chronic anticoagulation [Chronic Kidney Disease] : no chronic kidney disease [Diabetes] : no diabetes [FreeTextEntry1] : Rt hip replacement [FreeTextEntry2] : 8/5/25 [FreeTextEntry3] : Dr. Knapp FAX) [phone] and  [phone] [FreeTextEntry4] : Ms. DANIEL ROCHE is a 76 year old White  female  with history of osteoporosis, HTN, hyperlipidemia, atypical lobular hyperplasia breast presented today for preop medical clearance.  Reviewed note by Dr. Knapp.   She remains very active with yoga, exercises, walking except using stairs due to severe Rt hip joint pain.  For 4 years tried conservative tx and joint cortisone injections but it did not help much at this time.  Her initial surgery date was postponed to August from July. She went to [facility] 6/20/25 and this morning again. Denied fever, chills,CP, SOB, abdominal pain, n/v/c/d.

## 2025-07-20 NOTE — REVIEW OF SYSTEMS
[FreeTextEntry2] : l [FreeTextEntry3] : Constitutional:  no fever and no chills.  Eyes:  no discharge.  HEENT:  no earache.  Cardiovascular:  no chest pain, no palpitations and no lower extremity edema.  Respiratory:  no shortness of breath, no wheezing and no cough.  Gastrointestinal:  no abdominal pain, no nausea and no vomiting.  Genitourinary:  no dysuria.  Musculoskeletal:  no joint pain.  Integumentary:  no itching.  Neurological:  no headache.  Psychiatric:  not suicidal.  Hematologic/Lymphatic:  no easy bleeding.  [FreeTextEntry9] : see hpi

## 2025-07-20 NOTE — RESULTS/DATA
[de-identified] : Reviewed LAB [facility] PST: 6/20/2025 : CBC, BMP normal  except slightly increased serum calcium level 11.7,  [facility] PST on 7/16/25: CBC, CMP normal, Blood type O+, MRSA negative, MSSA positive., UA and urine culture negative, A1C 5.1%, serum calcium 11.7 stable. Reviewed ECG 07/16/2025 : results reviewed, NSR, HR 65bpm, no acute ST/T changes,

## 2025-07-20 NOTE — RESULTS/DATA
[de-identified] : Reviewed LAB [facility] PST: 6/20/2025 : CBC, BMP normal  except slightly increased serum calcium level 11.7,  [facility] PST on 7/16/25: CBC, CMP normal, Blood type O+, MRSA negative, MSSA positive., UA and urine culture negative, A1C 5.1%, serum calcium 11.7 stable. Reviewed ECG 07/16/2025 : results reviewed, NSR, HR 65bpm, no acute ST/T changes,

## 2025-07-20 NOTE — RESULTS/DATA
[de-identified] : Reviewed LAB [facility] PST: 6/20/2025 : CBC, BMP normal  except slightly increased serum calcium level 11.7,  [facility] PST on 7/16/25: CBC, CMP normal, Blood type O+, MRSA negative, MSSA positive., UA and urine culture negative, A1C 5.1%, serum calcium 11.7 stable. Reviewed ECG 07/16/2025 : results reviewed, NSR, HR 65bpm, no acute ST/T changes,

## 2025-07-20 NOTE — HISTORY OF PRESENT ILLNESS
[No Pertinent Cardiac History] : no history of aortic stenosis, atrial fibrillation, coronary artery disease, recent myocardial infarction, or implantable device/pacemaker [No Pertinent Pulmonary History] : no history of asthma, COPD, sleep apnea, or smoking [No Adverse Anesthesia Reaction] : no adverse anesthesia reaction in self or family member [(Patient denies any chest pain, claudication, dyspnea on exertion, orthopnea, palpitations or syncope)] : Patient denies any chest pain, claudication, dyspnea on exertion, orthopnea, palpitations or syncope [Moderate (4-6 METs)] : Moderate (4-6 METs) [Chronic Anticoagulation] : no chronic anticoagulation [Chronic Kidney Disease] : no chronic kidney disease [Diabetes] : no diabetes [FreeTextEntry1] : Rt hip replacement [FreeTextEntry2] : 8/5/25 [FreeTextEntry3] : Dr. Knapp FAX) [phone] and  [phone] [FreeTextEntry4] : Ms. DANIEL ROCHE is a 76 year old White  female  with history of osteoporosis, HTN, hyperlipidemia, atypical lobular hyperplasia breast presented today for preop medical clearance.  Reviewed note by Dr. Knapp.   She remains very active with yoga, exercises, walking except using stairs due to severe Rt hip joint pain.  For 4 years tried conservative tx and joint cortisone injections but it did not help much at this time.  Her initial surgery date was postponed to August from July. She went to [facility] 6/20/25 and this morning again. Denied fever, chills,CP, SOB, abdominal pain, n/v/c/d.